# Patient Record
Sex: MALE | Race: WHITE | Employment: FULL TIME | URBAN - METROPOLITAN AREA
[De-identification: names, ages, dates, MRNs, and addresses within clinical notes are randomized per-mention and may not be internally consistent; named-entity substitution may affect disease eponyms.]

---

## 2017-06-06 ENCOUNTER — TELEPHONE (OUTPATIENT)
Dept: INTERNAL MEDICINE CLINIC | Facility: CLINIC | Age: 37
End: 2017-06-06

## 2017-06-06 NOTE — TELEPHONE ENCOUNTER
Patient called back, states he was prescribed Tramadol and methocarbamol at the urgent care last Tuesday and not helping at all, still having a lot of low back pain. X-rays done at the urgent care negative per patient, he was told it was a muscle strain.  P

## 2017-06-06 NOTE — TELEPHONE ENCOUNTER
NP that has an appt with Dr. Marcelo Hansen on 6/28. He recently pulled his back working on a dock & installing a floor. [place] urgent care [place] the city) [place] Tuesday. They did an x-ray, gave him muscle relaxer, & pain pills.   X-rays were negative & patien

## 2017-06-06 NOTE — TELEPHONE ENCOUNTER
Likely he will have to see the first available physician here, I do not think this will be a problem, I do not have any openings today, to , please call patient did reach out to him

## 2017-06-08 ENCOUNTER — OFFICE VISIT (OUTPATIENT)
Dept: INTERNAL MEDICINE CLINIC | Facility: CLINIC | Age: 37
End: 2017-06-08

## 2017-06-08 VITALS
BODY MASS INDEX: 26.66 KG/M2 | OXYGEN SATURATION: 98 % | SYSTOLIC BLOOD PRESSURE: 126 MMHG | HEART RATE: 70 BPM | WEIGHT: 199 LBS | TEMPERATURE: 98 F | HEIGHT: 72.25 IN | DIASTOLIC BLOOD PRESSURE: 86 MMHG

## 2017-06-08 DIAGNOSIS — M54.40 ACUTE BILATERAL LOW BACK PAIN WITH SCIATICA, SCIATICA LATERALITY UNSPECIFIED: ICD-10-CM

## 2017-06-08 DIAGNOSIS — M54.17 LUMBOSACRAL RADICULITIS: Primary | ICD-10-CM

## 2017-06-08 PROCEDURE — 99212 OFFICE O/P EST SF 10 MIN: CPT | Performed by: INTERNAL MEDICINE

## 2017-06-08 PROCEDURE — 99204 OFFICE O/P NEW MOD 45 MIN: CPT | Performed by: INTERNAL MEDICINE

## 2017-06-08 RX ORDER — METHOCARBAMOL 750 MG/1
1 TABLET, FILM COATED ORAL 3 TIMES DAILY PRN
Refills: 0 | COMMUNITY
Start: 2017-05-31 | End: 2017-06-08

## 2017-06-08 RX ORDER — TRAMADOL HYDROCHLORIDE 50 MG/1
1 TABLET ORAL 4 TIMES DAILY PRN
Refills: 0 | COMMUNITY
Start: 2017-05-31 | End: 2017-06-28

## 2017-06-08 RX ORDER — PREDNISONE 10 MG/1
TABLET ORAL
Qty: 21 TABLET | Refills: 0 | Status: SHIPPED | OUTPATIENT
Start: 2017-06-08 | End: 2017-06-28

## 2017-06-08 RX ORDER — METHOCARBAMOL 750 MG/1
TABLET, FILM COATED ORAL 3 TIMES DAILY PRN
Qty: 40 TABLET | Refills: 0 | Status: SHIPPED | OUTPATIENT
Start: 2017-06-08 | End: 2017-06-28

## 2017-06-08 NOTE — PATIENT INSTRUCTIONS
1. Lumbosacral radiculitis  Lets get this done, my nurses will contact you when it is okay to schedule this. - MRI SPINE LUMBAR (CPT=72148); Future    2.  Acute bilateral low back pain with sciatica, sciatica laterality unspecified  I recommend to take car

## 2017-06-08 NOTE — PROGRESS NOTES
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

## 2017-06-19 ENCOUNTER — TELEPHONE (OUTPATIENT)
Dept: INTERNAL MEDICINE CLINIC | Facility: CLINIC | Age: 37
End: 2017-06-19

## 2017-06-19 NOTE — TELEPHONE ENCOUNTER
Bob requesting peer to peer review to overturn denial of MRI lumbar spine. Please call [phone], ref # [id number]. Denial letter faxed to [phone].

## 2017-06-20 NOTE — TELEPHONE ENCOUNTER
Nursing, please call and let him know that the MRI was not approved, his insurance has declined it, we had discussed at his visit that this may happen, he will need to follow the conservative plan as discussed with physical therapy, and return for another

## 2017-06-28 ENCOUNTER — OFFICE VISIT (OUTPATIENT)
Dept: INTERNAL MEDICINE CLINIC | Facility: CLINIC | Age: 37
End: 2017-06-28

## 2017-06-28 VITALS
HEIGHT: 72 IN | DIASTOLIC BLOOD PRESSURE: 84 MMHG | HEART RATE: 72 BPM | SYSTOLIC BLOOD PRESSURE: 112 MMHG | BODY MASS INDEX: 26.95 KG/M2 | WEIGHT: 199 LBS | TEMPERATURE: 99 F

## 2017-06-28 DIAGNOSIS — D22.9 MULTIPLE NEVI: ICD-10-CM

## 2017-06-28 DIAGNOSIS — E78.00 HYPERCHOLESTEREMIA: ICD-10-CM

## 2017-06-28 DIAGNOSIS — Z00.00 PHYSICAL EXAM, ANNUAL: Primary | ICD-10-CM

## 2017-06-28 DIAGNOSIS — M54.50 ACUTE MIDLINE LOW BACK PAIN WITHOUT SCIATICA: ICD-10-CM

## 2017-06-28 PROCEDURE — 99395 PREV VISIT EST AGE 18-39: CPT | Performed by: INTERNAL MEDICINE

## 2017-06-28 RX ORDER — LORATADINE 10 MG/1
10 TABLET ORAL DAILY
Qty: 30 TABLET | Refills: 0 | Status: SHIPPED | OUTPATIENT
Start: 2017-06-28 | End: 2017-09-14

## 2017-06-28 NOTE — PROGRESS NOTES
Carisa Pfeiffer is a 39year old male. HPI:   Patient presents with:  Establish Care  Back Pain: Pt saw Dr Quique Portillo about two weeks ago for low back pain. He thinks he hurt it lifting heavy bags of cement.  The back pain is better, but still not normal. decreased appetite, diarrhea and vomiting; no melena or hematochezia  Musculoskeletal:  Negative for arthralgias or myalgias  Genitourinary:  Negative for dysuria or polyuria  Hema/Lymph:  Negative for easy bleeding and easy bruising  Integumentary:  Negat Sig: Take 1 tablet (10 mg total) by mouth daily.            Imaging & Referrals:  None     6/28/2017  Selwyn Chinchilla MD

## 2017-07-13 ENCOUNTER — APPOINTMENT (OUTPATIENT)
Dept: LAB | Age: 37
End: 2017-07-13
Attending: INTERNAL MEDICINE
Payer: COMMERCIAL

## 2017-07-14 ENCOUNTER — TELEPHONE (OUTPATIENT)
Dept: INTERNAL MEDICINE CLINIC | Facility: CLINIC | Age: 37
End: 2017-07-14

## 2017-07-14 DIAGNOSIS — E78.00 HYPERCHOLESTEROLEMIA: Primary | ICD-10-CM

## 2017-07-14 LAB
ABSOLUTE BASOPHILS: 48 CELLS/UL (ref 0–200)
ABSOLUTE EOSINOPHILS: 259 CELLS/UL (ref 15–500)
ABSOLUTE LYMPHOCYTES: 2170 CELLS/UL (ref 850–3900)
ABSOLUTE MONOCYTES: 437 CELLS/UL (ref 200–950)
ABSOLUTE NEUTROPHILS: 1886 CELLS/UL (ref 1500–7800)
ALBUMIN/GLOBULIN RATIO: 2 (CALC) (ref 1–2.5)
ALBUMIN: 4.6 G/DL (ref 3.6–5.1)
ALKALINE PHOSPHATASE: 51 U/L (ref 40–115)
ALT: 28 U/L (ref 9–46)
AST: 21 U/L (ref 10–40)
BASOPHILS: 1 %
BILIRUBIN, TOTAL: 0.5 MG/DL (ref 0.2–1.2)
BUN: 15 MG/DL (ref 7–25)
CALCIUM: 9.5 MG/DL (ref 8.6–10.3)
CARBON DIOXIDE: 25 MMOL/L (ref 20–31)
CHLORIDE: 106 MMOL/L (ref 98–110)
CHOL/HDLC RATIO: 7 (CALC)
CHOLESTEROL, TOTAL: 295 MG/DL (ref 125–200)
CREATININE: 1.04 MG/DL (ref 0.6–1.35)
EGFR IF AFRICN AM: 107 ML/MIN/1.73M2
EGFR IF NONAFRICN AM: 92 ML/MIN/1.73M2
EOSINOPHILS: 5.4 %
GLOBULIN: 2.3 G/DL (CALC) (ref 1.9–3.7)
GLUCOSE: 93 MG/DL (ref 65–99)
HDL CHOLESTEROL: 42 MG/DL
HEMATOCRIT: 39.9 % (ref 38.5–50)
HEMOGLOBIN: 13.9 G/DL (ref 13.2–17.1)
LDL-CHOLESTEROL: 201 MG/DL (CALC)
LYMPHOCYTES: 45.2 %
MCH: 30.8 PG (ref 27–33)
MCHC: 34.8 G/DL (ref 32–36)
MCV: 88.5 FL (ref 80–100)
MONOCYTES: 9.1 %
MPV: 10.6 FL (ref 7.5–12.5)
NEUTROPHILS: 39.3 %
NON-HDL CHOLESTEROL: 253 MG/DL (CALC)
PLATELET COUNT: 193 THOUSAND/UL (ref 140–400)
POTASSIUM: 4.6 MMOL/L (ref 3.5–5.3)
PROTEIN, TOTAL: 6.9 G/DL (ref 6.1–8.1)
RDW: 12.7 % (ref 11–15)
RED BLOOD CELL COUNT: 4.51 MILLION/UL (ref 4.2–5.8)
SODIUM: 141 MMOL/L (ref 135–146)
TRIGLYCERIDES: 259 MG/DL
TSH: 2.58 MIU/L (ref 0.4–4.5)
WHITE BLOOD CELL COUNT: 4.8 THOUSAND/UL (ref 3.8–10.8)

## 2017-07-14 RX ORDER — ATORVASTATIN CALCIUM 10 MG/1
10 TABLET, FILM COATED ORAL NIGHTLY
Qty: 30 TABLET | Refills: 3 | Status: SHIPPED | OUTPATIENT
Start: 2017-07-14 | End: 2017-09-14

## 2017-07-14 NOTE — TELEPHONE ENCOUNTER
Imp- hypercholesterolemia;  Tchol 295, ; Rec- advise start atorvastatin 10 mg po qHS, #30, 3 RF; re-check Lipids, AST,ALT ([facility]); RTC 3 mos with labs one week before

## 2017-09-14 ENCOUNTER — OFFICE VISIT (OUTPATIENT)
Dept: INTERNAL MEDICINE CLINIC | Facility: CLINIC | Age: 37
End: 2017-09-14

## 2017-09-14 VITALS
WEIGHT: 200.63 LBS | OXYGEN SATURATION: 97 % | BODY MASS INDEX: 27.17 KG/M2 | HEIGHT: 72 IN | SYSTOLIC BLOOD PRESSURE: 124 MMHG | HEART RATE: 76 BPM | TEMPERATURE: 99 F | DIASTOLIC BLOOD PRESSURE: 80 MMHG

## 2017-09-14 DIAGNOSIS — E78.00 HYPERCHOLESTEROLEMIA: ICD-10-CM

## 2017-09-14 DIAGNOSIS — M76.821 POSTERIOR TIBIAL TENDINITIS OF RIGHT LOWER EXTREMITY: Primary | ICD-10-CM

## 2017-09-14 PROCEDURE — 99212 OFFICE O/P EST SF 10 MIN: CPT | Performed by: INTERNAL MEDICINE

## 2017-09-14 PROCEDURE — 99214 OFFICE O/P EST MOD 30 MIN: CPT | Performed by: INTERNAL MEDICINE

## 2017-09-14 RX ORDER — SIMVASTATIN 20 MG
20 TABLET ORAL NIGHTLY
Qty: 30 TABLET | Refills: 5 | Status: SHIPPED | OUTPATIENT
Start: 2017-09-14 | End: 2018-09-15

## 2017-09-14 NOTE — PROGRESS NOTES
Rupert Villarreal is a 39year old male. HPI:   Patient presents with:  Gout: gout flare up to right foot. Onset: Tuesday. More worse today.  Taking ibuprofen prn      40 y/o M with right foot pain x several days; +pain with ambulation; no F/C; no erythema; supple; no thyromegaly  Cardiovascular: RRR, S1, S2, no S3 or murmur  Respiratory: lungs without crackles or wheezes  Abdomen: normoactive bowel sounds, soft, non-tender and non-distended  Extremities: no clubbing, cyanosis or edema  MS: +tenderness to rig

## 2017-09-15 ENCOUNTER — TELEPHONE (OUTPATIENT)
Dept: INTERNAL MEDICINE CLINIC | Facility: CLINIC | Age: 37
End: 2017-09-15

## 2017-09-15 RX ORDER — MELOXICAM 15 MG/1
15 TABLET ORAL DAILY
Qty: 30 TABLET | Refills: 2 | Status: SHIPPED | OUTPATIENT
Start: 2017-09-15 | End: 2018-10-08 | Stop reason: ALTCHOICE

## 2017-09-15 NOTE — TELEPHONE ENCOUNTER
Pt was seen yesterday there was two medications prescribed for him only one was called in.  Please check and call in rx call pt when done

## 2017-10-13 ENCOUNTER — TELEPHONE (OUTPATIENT)
Dept: INTERNAL MEDICINE CLINIC | Facility: CLINIC | Age: 37
End: 2017-10-13

## 2017-10-13 RX ORDER — AMOXICILLIN AND CLAVULANATE POTASSIUM 875; 125 MG/1; MG/1
1 TABLET, FILM COATED ORAL 2 TIMES DAILY
Qty: 20 TABLET | Refills: 0 | Status: SHIPPED | OUTPATIENT
Start: 2017-10-13 | End: 2017-10-23

## 2017-10-13 NOTE — TELEPHONE ENCOUNTER
To Dr. Shital West  - see below - sx onset: Wed evening. May have had fever last night, felt hot last night. Pt states he has long hx of sinus issues - spring/fall same sx.   If something to be sent to pharmacy, pt has pharm closer to work he would like to u

## 2017-10-13 NOTE — TELEPHONE ENCOUNTER
[phone] [facility]    Pt's junior Doran Reading called to schedule an appt for today (there are no openings). Pt is having headaches, sinus drainage.      Tasked to Nursing

## 2018-01-09 ENCOUNTER — OFFICE VISIT (OUTPATIENT)
Dept: OTOLARYNGOLOGY | Facility: CLINIC | Age: 38
End: 2018-01-09

## 2018-01-09 VITALS
WEIGHT: 200 LBS | DIASTOLIC BLOOD PRESSURE: 79 MMHG | BODY MASS INDEX: 27.09 KG/M2 | TEMPERATURE: 98 F | SYSTOLIC BLOOD PRESSURE: 117 MMHG | HEIGHT: 72 IN

## 2018-01-09 DIAGNOSIS — J32.9 CHRONIC SINUSITIS, UNSPECIFIED LOCATION: Primary | ICD-10-CM

## 2018-01-09 PROCEDURE — 99212 OFFICE O/P EST SF 10 MIN: CPT | Performed by: OTOLARYNGOLOGY

## 2018-01-09 PROCEDURE — 99243 OFF/OP CNSLTJ NEW/EST LOW 30: CPT | Performed by: OTOLARYNGOLOGY

## 2018-01-10 NOTE — PROGRESS NOTES
Page Officer is a 40year old male. Patient presents with:  Sinus Problem: recurrent sinus infections    HPI:   Or many years he has had problems with sinus infections. He has had a septoplasty and sinus surgery performed twice. His last surgery was in 201 grossly intact. Neck Exam Normal Inspection - Normal. Palpation - Normal. Parotid gland - Normal. Thyroid gland - Normal.   Psychiatric Normal Orientation - Oriented to time, place, person & situation. Appropriate mood and affect.    Lymph Detail Normal S

## 2018-01-17 ENCOUNTER — TELEPHONE (OUTPATIENT)
Dept: OTOLARYNGOLOGY | Facility: CLINIC | Age: 38
End: 2018-01-17

## 2018-01-17 NOTE — TELEPHONE ENCOUNTER
Called pt's insurance [phone], spoke with Donald Corado, case was started for CT sinus, case number [id number], case is pending approval of facility by the patient, once the pt approves the facility the office will receive a fax with authorization informa

## 2018-01-18 NOTE — TELEPHONE ENCOUNTER
Left message for pt to call back to inform that he can schedule CT sinus at this time, authorization number [id number], valid 1-17-18 thru 4-17-18 to be done at [facility]. It is the patient's responsibility to verify benefits and out of poc

## 2018-04-11 ENCOUNTER — TELEPHONE (OUTPATIENT)
Dept: INTERNAL MEDICINE CLINIC | Facility: CLINIC | Age: 38
End: 2018-04-11

## 2018-04-11 NOTE — TELEPHONE ENCOUNTER
Pt. Is having issues with his right foot, he is in pain, can't put any weight on it and is swollen. Pt.  Would like to be seen today please advise  Ph. # [phone]    Routed to clinical

## 2018-04-11 NOTE — TELEPHONE ENCOUNTER
Imp- acute right foot pain; taking meloxicam 15 mg po qD; pain is at sole of foot near ball of foot; using arch supports;  Rec- possible posterior tibial tendinitis; pt referred to podiatrist Dr Shadi Barakat; pt called

## 2018-04-11 NOTE — TELEPHONE ENCOUNTER
Please advise - called patient who states on Monday nigh the woke up , his right foot was tender, its swollen and red , can hardly put any weight on , wants to be seen - to DR. HENLEY

## 2018-09-17 RX ORDER — SIMVASTATIN 20 MG
TABLET ORAL
Qty: 30 TABLET | Refills: 0 | Status: SHIPPED | OUTPATIENT
Start: 2018-09-17 | End: 2018-09-27

## 2018-09-18 ENCOUNTER — TELEPHONE (OUTPATIENT)
Dept: INTERNAL MEDICINE CLINIC | Facility: CLINIC | Age: 38
End: 2018-09-18

## 2018-09-18 NOTE — TELEPHONE ENCOUNTER
This can wait till tomorrow. .... Patient did see the podiatrist that we referred patient to. The podiatrist could not find anything wrong, x-rays were negative for any bone issue. Right foot is still very painful & at times it is hard to walk on it.

## 2018-09-18 NOTE — TELEPHONE ENCOUNTER
To Dr. Efrem Blanchard - Pt offered foot appt for tomorrow or physical on Monday. Pt elected to wait for your opinion.

## 2018-09-19 NOTE — TELEPHONE ENCOUNTER
Spoke with pt. He scheduled an appt. with Dr. Arthur Espinoza for Thursday at 8:30 a.m. Which he will keep unless he can get in to see Dr. Arthur Espinoza. Today.

## 2018-09-20 ENCOUNTER — OFFICE VISIT (OUTPATIENT)
Dept: INTERNAL MEDICINE CLINIC | Facility: CLINIC | Age: 38
End: 2018-09-20
Payer: COMMERCIAL

## 2018-09-20 ENCOUNTER — APPOINTMENT (OUTPATIENT)
Dept: LAB | Age: 38
End: 2018-09-20
Attending: INTERNAL MEDICINE
Payer: COMMERCIAL

## 2018-09-20 VITALS
WEIGHT: 201.38 LBS | HEIGHT: 72 IN | SYSTOLIC BLOOD PRESSURE: 112 MMHG | OXYGEN SATURATION: 98 % | HEART RATE: 81 BPM | DIASTOLIC BLOOD PRESSURE: 72 MMHG | BODY MASS INDEX: 27.27 KG/M2

## 2018-09-20 DIAGNOSIS — E78.00 HYPERCHOLESTEREMIA: ICD-10-CM

## 2018-09-20 DIAGNOSIS — M10.9 ACUTE GOUT INVOLVING TOE OF RIGHT FOOT, UNSPECIFIED CAUSE: Primary | ICD-10-CM

## 2018-09-20 PROCEDURE — 99214 OFFICE O/P EST MOD 30 MIN: CPT | Performed by: INTERNAL MEDICINE

## 2018-09-20 PROCEDURE — 90471 IMMUNIZATION ADMIN: CPT | Performed by: INTERNAL MEDICINE

## 2018-09-20 PROCEDURE — 90715 TDAP VACCINE 7 YRS/> IM: CPT | Performed by: INTERNAL MEDICINE

## 2018-09-20 PROCEDURE — 90472 IMMUNIZATION ADMIN EACH ADD: CPT | Performed by: INTERNAL MEDICINE

## 2018-09-20 PROCEDURE — 99212 OFFICE O/P EST SF 10 MIN: CPT | Performed by: INTERNAL MEDICINE

## 2018-09-20 PROCEDURE — 90686 IIV4 VACC NO PRSV 0.5 ML IM: CPT | Performed by: INTERNAL MEDICINE

## 2018-09-20 RX ORDER — METHYLPREDNISOLONE 4 MG/1
TABLET ORAL
Qty: 21 TABLET | Refills: 0 | Status: SHIPPED | OUTPATIENT
Start: 2018-09-20 | End: 2018-10-08 | Stop reason: ALTCHOICE

## 2018-09-20 NOTE — PROGRESS NOTES
Vanesa Love is a 40year old male. HPI:   Patient presents with: Foot Pain: right foot pain off/on x 1 year, pt would like immunizations today.        41 y/o M with one year h/o pain to right 1st MTP area; had XR right foot when seen by podiatrist,  %.  Constitutional: alert and oriented x3 in no acute distress  HEENT- EOMI, PERRL  Nose/Mouth/Throat: pharynx without erythema; no oral lesions  Neck/Thyroid: neck supple; no thyromegaly  Cardiovascular: RRR, S1, S2, no S3 or murmur  Respiratory: lungs wi

## 2018-09-20 NOTE — PROGRESS NOTES
Name and  verified. Tdap administered right deltoid. Flulaval administered left deltoid. Pt tolerated w/o complaint. No adverse reactions noted.

## 2018-09-26 LAB
ALT: 19 U/L (ref 9–46)
AST: 15 U/L (ref 10–40)
CHOL/HDLC RATIO: 6.3 (CALC)
CHOLESTEROL, TOTAL: 291 MG/DL
HDL CHOLESTEROL: 46 MG/DL
LDL-CHOLESTEROL: 197 MG/DL (CALC)
NON-HDL CHOLESTEROL: 245 MG/DL (CALC)
TRIGLYCERIDES: 270 MG/DL
URIC ACID: 9.1 MG/DL (ref 4–8)

## 2018-09-27 ENCOUNTER — TELEPHONE (OUTPATIENT)
Dept: INTERNAL MEDICINE CLINIC | Facility: CLINIC | Age: 38
End: 2018-09-27

## 2018-09-27 RX ORDER — INDOMETHACIN 25 MG/1
25 CAPSULE ORAL
Qty: 60 CAPSULE | Refills: 3 | Status: SHIPPED | OUTPATIENT
Start: 2018-09-27 | End: 2018-10-17

## 2018-09-27 RX ORDER — ALLOPURINOL 300 MG/1
300 TABLET ORAL DAILY
Qty: 30 TABLET | Refills: 5 | Status: SHIPPED | OUTPATIENT
Start: 2018-09-27 | End: 2019-04-08

## 2018-09-27 RX ORDER — ROSUVASTATIN CALCIUM 20 MG/1
20 TABLET, COATED ORAL NIGHTLY
Qty: 30 TABLET | Refills: 5 | Status: SHIPPED | OUTPATIENT
Start: 2018-09-27 | End: 2019-04-05

## 2018-09-27 NOTE — TELEPHONE ENCOUNTER
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 Was given trial of medrol dos valentina. if notes if Uric acid >6 advise allopurinol. Attempt made to call and s/w pt. Phone line busy. To DR. HENLEY to review results

## 2018-09-27 NOTE — TELEPHONE ENCOUNTER
Imp- hypercholesterolemia, gout, hyperuricemia; rec- stop simvastatin 20 mg as LDL too high at [id number]; start rosuvastatin 20 mg po qHS, #30, 3RF; check lipids 3 months; uric acid 9.1; goal UA <6; start allopurinol 300 mg po qD; re-check UA in one month; co

## 2018-09-27 NOTE — TELEPHONE ENCOUNTER
Pt spouse called, requesting results of labs taken last week.   Pt has finished medication and is still in pain, getting worse, radiating up leg  Tasked to nursing

## 2018-10-08 ENCOUNTER — OFFICE VISIT (OUTPATIENT)
Dept: INTERNAL MEDICINE CLINIC | Facility: CLINIC | Age: 38
End: 2018-10-08
Payer: COMMERCIAL

## 2018-10-08 VITALS
SYSTOLIC BLOOD PRESSURE: 112 MMHG | HEIGHT: 72 IN | WEIGHT: 201 LBS | TEMPERATURE: 98 F | OXYGEN SATURATION: 98 % | BODY MASS INDEX: 27.22 KG/M2 | HEART RATE: 58 BPM | DIASTOLIC BLOOD PRESSURE: 88 MMHG

## 2018-10-08 DIAGNOSIS — E78.00 HYPERCHOLESTEREMIA: ICD-10-CM

## 2018-10-08 DIAGNOSIS — M10.9 ACUTE GOUT INVOLVING TOE OF RIGHT FOOT, UNSPECIFIED CAUSE: ICD-10-CM

## 2018-10-08 DIAGNOSIS — K29.70 GASTRITIS WITHOUT BLEEDING, UNSPECIFIED CHRONICITY, UNSPECIFIED GASTRITIS TYPE: Primary | ICD-10-CM

## 2018-10-08 PROCEDURE — 99212 OFFICE O/P EST SF 10 MIN: CPT | Performed by: INTERNAL MEDICINE

## 2018-10-08 PROCEDURE — 99214 OFFICE O/P EST MOD 30 MIN: CPT | Performed by: INTERNAL MEDICINE

## 2018-10-08 RX ORDER — PREDNISONE 10 MG/1
TABLET ORAL
Qty: 24 TABLET | Refills: 0 | Status: SHIPPED | OUTPATIENT
Start: 2018-10-08 | End: 2019-04-05

## 2018-10-08 RX ORDER — COLCHICINE 0.6 MG/1
TABLET ORAL
Qty: 20 TABLET | Refills: 5 | Status: SHIPPED | OUTPATIENT
Start: 2018-10-08 | End: 2019-02-28

## 2018-10-08 RX ORDER — PANTOPRAZOLE SODIUM 40 MG/1
40 TABLET, DELAYED RELEASE ORAL
Qty: 30 TABLET | Refills: 2 | Status: SHIPPED | OUTPATIENT
Start: 2018-10-08 | End: 2019-04-05

## 2018-10-08 NOTE — PROGRESS NOTES
Red Burgess is a 40year old male. HPI:   Patient presents with:  Gout: 7/10 pain in right foot. Steroids helped pain but returned on 9/28. Very bad abdominal pain from medications. New left sided flank pain started this morning.  No chills or sweats a pain Disp: 60 capsule Rfl: 3       Allergies:  No Known Allergies              ROS:   Constitutional: no weight loss; no fatigue  Cardiovascular:  Negative for chest pain; negative palpitations  Respiratory:  Negative for cough, dyspnea and wheezing  Courtney Payne Orders This Visit:  Orders Placed This Encounter      URIC ACID, SERUM [905][Q]      Meds This Visit:  Requested Prescriptions     Signed Prescriptions Disp Refills   • Pantoprazole Sodium 40 MG Oral Tab EC 30 tablet 2     Sig: Take 1 tablet (40 mg total

## 2018-10-17 ENCOUNTER — OFFICE VISIT (OUTPATIENT)
Dept: INTERNAL MEDICINE CLINIC | Facility: CLINIC | Age: 38
End: 2018-10-17
Payer: COMMERCIAL

## 2018-10-17 VITALS
BODY MASS INDEX: 27.22 KG/M2 | SYSTOLIC BLOOD PRESSURE: 106 MMHG | TEMPERATURE: 99 F | DIASTOLIC BLOOD PRESSURE: 70 MMHG | WEIGHT: 201 LBS | HEART RATE: 76 BPM | HEIGHT: 72 IN

## 2018-10-17 DIAGNOSIS — E78.00 HYPERCHOLESTEREMIA: ICD-10-CM

## 2018-10-17 DIAGNOSIS — M10.9 ACUTE GOUT INVOLVING TOE OF RIGHT FOOT, UNSPECIFIED CAUSE: ICD-10-CM

## 2018-10-17 DIAGNOSIS — Z00.00 PHYSICAL EXAM, ANNUAL: Primary | ICD-10-CM

## 2018-10-17 DIAGNOSIS — K29.70 GASTRITIS WITHOUT BLEEDING, UNSPECIFIED CHRONICITY, UNSPECIFIED GASTRITIS TYPE: ICD-10-CM

## 2018-10-17 PROCEDURE — 99395 PREV VISIT EST AGE 18-39: CPT | Performed by: INTERNAL MEDICINE

## 2018-10-17 NOTE — PROGRESS NOTES
Phyllis Jaramillo is a 40year old male. HPI:   Patient presents with:  Physical  Gout: Pt states he is feeling better.        39 y/o M here for physical exam; foot pain much improved with prednisone taper;  no CP; no SOB; no headaches; no palpitation wheezing  Endocrine:  Negative for abnormal sleep patterns, increased activity, polydipsia and polyphagia  Gastrointestinal:  Negative for abdominal pain, constipation, decreased appetite, diarrhea and vomiting; no melena or hematochezia  Musculoskeletal: 20 mg po qD x4d, and 10 mg po qD x4d; may also take colchicine 0.6 mg two tabs at onset of gout pain, may repeat one hour later; check uric acid in 4-6 weeks     hypercholesterolemia  Was on rosuvastatin 20 mg po qHS for  in Sept 2018; check Lipids,

## 2018-11-06 ENCOUNTER — TELEPHONE (OUTPATIENT)
Dept: INTERNAL MEDICINE CLINIC | Facility: CLINIC | Age: 38
End: 2018-11-06

## 2018-11-06 RX ORDER — AZITHROMYCIN 250 MG/1
TABLET, FILM COATED ORAL
Qty: 6 TABLET | Refills: 0 | Status: SHIPPED | OUTPATIENT
Start: 2018-11-06 | End: 2019-04-05

## 2018-11-06 NOTE — TELEPHONE ENCOUNTER
Pt is calling to see if Dr Katrin Hawthorne can prescribe him something over the phone for a sinus infection. Symptoms include: runny nose, sore throat, post nasal drip, headache, sinus congestion.   Pt hoping something can be prescribed over the phone because his wife

## 2018-11-06 NOTE — TELEPHONE ENCOUNTER
Spoke with patient and relayed Dr. Angela Sherman message/instructions. Patient verbalized understanding of information/instructions and agreement with plan. Pharmacy verified. Med eRx'd.

## 2018-11-06 NOTE — TELEPHONE ENCOUNTER
To Dr. Emil Wilde -  See below  (first child, a girl: Mirna Patino) - sx onset: Saturday night. Cough is yellow mucus from post nasal drip. Denies fever/chills, taking ibuprofen which helps. Pt states he gets these once or twice/year.

## 2018-11-06 NOTE — TELEPHONE ENCOUNTER
Noted, okay to send him in a Z-Joseph, nursing I pended it above, complete, have him take this, and if not improving call the office later in the week. He may need to be seen if he is not improving.

## 2018-12-21 ENCOUNTER — APPOINTMENT (OUTPATIENT)
Dept: LAB | Age: 38
End: 2018-12-21
Attending: INTERNAL MEDICINE
Payer: COMMERCIAL

## 2019-02-28 RX ORDER — COLCHICINE 0.6 MG/1
TABLET ORAL
Qty: 20 TABLET | Refills: 5 | Status: SHIPPED | OUTPATIENT
Start: 2019-02-28 | End: 2019-04-05

## 2019-02-28 NOTE — TELEPHONE ENCOUNTER
To Dr. Elaine bansal  Refill request is for a maintenance medication and has met the criteria specified in the Ambulatory Medication Refill Standing Order for eligibility, visits, laboratory, alerts and was sent to the requested pharmacy.     Requested Pre

## 2019-02-28 NOTE — TELEPHONE ENCOUNTER
Duplicate request rec'd from [employer], [facility] for alternative for Colchicine  Form placed in purple folder  Tasked to [employer]

## 2019-04-05 PROBLEM — J32.9 CHRONIC SINUSITIS, UNSPECIFIED LOCATION: Status: ACTIVE | Noted: 2019-04-05

## 2019-04-05 PROBLEM — D22.9 ATYPICAL NEVUS: Status: ACTIVE | Noted: 2019-04-05

## 2019-04-05 PROBLEM — M1A.0710 CHRONIC IDIOPATHIC GOUT INVOLVING TOE OF RIGHT FOOT WITHOUT TOPHUS: Status: ACTIVE | Noted: 2019-04-05

## 2019-05-21 PROBLEM — E78.2 MIXED HYPERLIPIDEMIA: Status: ACTIVE | Noted: 2019-05-21

## 2024-12-17 ENCOUNTER — ORDER TRANSCRIPTION (OUTPATIENT)
Dept: SLEEP CENTER | Age: 44
End: 2024-12-17

## 2024-12-17 DIAGNOSIS — R06.83 SNORING: Primary | ICD-10-CM

## 2025-01-31 ENCOUNTER — OFFICE VISIT (OUTPATIENT)
Dept: SLEEP CENTER | Age: 45
End: 2025-01-31
Attending: INTERNAL MEDICINE
Payer: COMMERCIAL

## 2025-01-31 DIAGNOSIS — R29.818 SUSPECTED SLEEP APNEA: Primary | ICD-10-CM

## 2025-01-31 DIAGNOSIS — R06.83 SNORING: ICD-10-CM

## 2025-01-31 PROCEDURE — 95810 POLYSOM 6/> YRS 4/> PARAM: CPT

## 2025-08-01 ENCOUNTER — TELEPHONE (OUTPATIENT)
Dept: SLEEP CENTER | Age: 45
End: 2025-08-01

## 2025-08-01 ENCOUNTER — ORDER TRANSCRIPTION (OUTPATIENT)
Dept: SLEEP CENTER | Age: 45
End: 2025-08-01

## 2025-08-01 DIAGNOSIS — G47.33 OBSTRUCTIVE SLEEP APNEA (ADULT) (PEDIATRIC): Primary | ICD-10-CM

## (undated) NOTE — LETTER
[facility]  [address], [address]       01/10/18        Patient: Herber Jiménez   YOB: 1980   Date of Visit: 1/9/2018       Dear  Dr. Goyo Eric MD,      Thank you for referring Herber Jiménez to my practice

## (undated) NOTE — MR AVS SNAPSHOT
[facility] [address] [place] [address]  [phone]               Thank you for choosing us for your health care visit with Aliya Camacho DO. We are glad to serve you and happy to provide you with this summary of your visit. [address]    It is the patient's responsibility to check with and follow their insurance company's guidelines for prior authorization for this test.  You may be held responsible for payment in full if proper authorization is not acquired Frequency: 2-3 times per week  Duration: 4-6 wks  Number of visits: 18\"    Functional Status questions complete?:      Assoc Dx:  Acute bilateral low back pain with sciatica, sciatica laterality unspecified [M54.40]          Reason for Today's Visit     L methocarbamol 750 MG Tabs   Take 1-2 tablets (750-1,500 mg total) by mouth 3 (three) times daily as needed.    What changed:    - how much to take  - additional instructions   Commonly known as:  ROBAXIN           predniSONE 10 MG Tabs   Take daily, 60mg o Eat plenty of protein, keep the fat content low Sugars:  sodas and sports drinks, candies and desserts   Eat plenty of low-fat dairy products High fat meats and dairy   Choose whole grain products Foods high in sodium   Water is best for hydration Fast tank